# Patient Record
Sex: MALE | Race: WHITE
[De-identification: names, ages, dates, MRNs, and addresses within clinical notes are randomized per-mention and may not be internally consistent; named-entity substitution may affect disease eponyms.]

---

## 2021-02-07 ENCOUNTER — HOSPITAL ENCOUNTER (EMERGENCY)
Dept: HOSPITAL 33 - ED | Age: 26
Discharge: HOME | End: 2021-02-07
Payer: MEDICAID

## 2021-02-07 VITALS — HEART RATE: 54 BPM | OXYGEN SATURATION: 98 % | DIASTOLIC BLOOD PRESSURE: 75 MMHG | SYSTOLIC BLOOD PRESSURE: 117 MMHG

## 2021-02-07 DIAGNOSIS — R07.89: Primary | ICD-10-CM

## 2021-02-07 LAB
ALBUMIN SERPL-MCNC: 4.9 G/DL (ref 3.5–5)
ALP SERPL-CCNC: 68 U/L (ref 38–126)
ALT SERPL-CCNC: 41 U/L (ref 0–50)
ANION GAP SERPL CALC-SCNC: 13.6 MEQ/L (ref 5–15)
AST SERPL QL: 34 U/L (ref 17–59)
BASOPHILS # BLD AUTO: 0.03 10*3/UL (ref 0–0.4)
BASOPHILS NFR BLD AUTO: 0.3 % (ref 0–0.4)
BILIRUB BLD-MCNC: 0.6 MG/DL (ref 0.2–1.3)
BNP SERPL-MCNC: 23.6 PG/ML (ref 0–450)
BUN SERPL-MCNC: 15 MG/DL (ref 9–20)
CALCIUM SPEC-MCNC: 10.4 MG/DL (ref 8.4–10.2)
CHLORIDE SERPL-SCNC: 101 MMOL/L (ref 98–107)
CO2 SERPL-SCNC: 28 MMOL/L (ref 22–30)
CREAT SERPL-MCNC: 0.97 MG/DL (ref 0.66–1.25)
EOSINOPHIL # BLD AUTO: 0.26 10*3/UL (ref 0–0.5)
GFR SERPLBLD BASED ON 1.73 SQ M-ARVRAT: > 60 ML/MIN
GLUCOSE SERPL-MCNC: 97 MG/DL (ref 74–106)
HCT VFR BLD AUTO: 49.1 % (ref 42–50)
HGB BLD-MCNC: 15.9 GM/DL (ref 12.5–18)
LYMPHOCYTES # SPEC AUTO: 3.88 10*3/UL (ref 1–4.6)
MAGNESIUM SERPL-MCNC: 2.1 MG/DL (ref 1.6–2.3)
MCH RBC QN AUTO: 30.6 PG (ref 26–32)
MCHC RBC AUTO-ENTMCNC: 32.4 G/DL (ref 32–36)
MONOCYTES # BLD AUTO: 0.96 10*3/UL (ref 0–1.3)
PLATELET # BLD AUTO: 224 K/MM3 (ref 150–450)
POTASSIUM SERPLBLD-SCNC: 3.8 MMOL/L (ref 3.5–5.1)
PROT SERPL-MCNC: 7.8 G/DL (ref 6.3–8.2)
RBC # BLD AUTO: 5.19 M/MM3 (ref 4.1–5.6)
SODIUM SERPL-SCNC: 139 MMOL/L (ref 137–145)
WBC # BLD AUTO: 10.2 K/MM3 (ref 4–10.5)

## 2021-02-07 PROCEDURE — 83735 ASSAY OF MAGNESIUM: CPT

## 2021-02-07 PROCEDURE — 71046 X-RAY EXAM CHEST 2 VIEWS: CPT

## 2021-02-07 PROCEDURE — 80053 COMPREHEN METABOLIC PANEL: CPT

## 2021-02-07 PROCEDURE — 85025 COMPLETE CBC W/AUTO DIFF WBC: CPT

## 2021-02-07 PROCEDURE — 36415 COLL VENOUS BLD VENIPUNCTURE: CPT

## 2021-02-07 PROCEDURE — 99284 EMERGENCY DEPT VISIT MOD MDM: CPT

## 2021-02-07 PROCEDURE — 93041 RHYTHM ECG TRACING: CPT

## 2021-02-07 PROCEDURE — 83880 ASSAY OF NATRIURETIC PEPTIDE: CPT

## 2021-02-07 PROCEDURE — 93005 ELECTROCARDIOGRAM TRACING: CPT

## 2021-02-07 PROCEDURE — 84484 ASSAY OF TROPONIN QUANT: CPT

## 2021-02-07 PROCEDURE — 94760 N-INVAS EAR/PLS OXIMETRY 1: CPT

## 2021-02-07 PROCEDURE — 85379 FIBRIN DEGRADATION QUANT: CPT

## 2021-02-07 NOTE — ERPHSYRPT
- History of Present Illness


Time Seen by Provider: 02/07/21 19:10


Historian: patient


Exam Limitations: no limitations


Patient Subjective Stated Complaint: pt here for chest pain to left side of 

chest. pain started at 1500 today while smoking, states hurts to lift arm


Triage Nursing Assessment: pt alert,walked in,able to undress self, resp easy, 

skin w/d/p. face mask in place,


Physician History: 





For the past 4 hours pt has had constant left anterior chest pressure 2/10 in 

severity; denies shortness of air, fever, diaphoresis, nausea, vomiting.


Aspirin Treatment Today: 81 mg x 4, provided by ED


Allergies/Adverse Reactions: 








No Known Drug Allergies Allergy (Verified 02/07/21 18:51)


   





Home Medications: 








No Reportable Medications [No Reported Medications]  10/06/15 [History]





Hx Tetanus, Diphtheria Vaccination/Date Given: No


Hx Influenza Vaccination/Date Given: No


Hx Pneumococcal Vaccination/Date Given: No


Immunizations Up to Date: Yes





Travel Risk





- International Travel


Have you traveled outside of the country in past 3 weeks: No





- Coronavirus Screening


Are you exhibiting any of the following symptoms?: No


Close contact with a COVID-19 positive Pt in past 14-21 Days: No





- Review of Systems


Constitutional: No Fever, No Chills


Respiratory: No Dyspnea


Cardiac: Other (chest pressure)


Abdominal/Gastrointestinal: No Abdominal Pain, No Nausea, No Vomiting


Skin: No Rash


Neurological: No Headache


All Other Systems: Reviewed and Negative





- Past Medical History


Pertinent Past Medical History: No


Neurological History: No Pertinent History


ENT History: No Pertinent History


Cardiac History: No Pertinent History


Respiratory History: No Pertinent History


Endocrine Medical History: No Pertinent History


Musculoskeletal History: No Pertinent History


GI Medical History: No Pertinent History


 History: No Pertinent History


Psycho-Social History: No Pertinent History


Male Reproductive Disorders: No Pertinent History





- Past Surgical History


Past Surgical History: No


Neuro Surgical History: No Pertinent History


Cardiac: No Pertinent History


Respiratory: No Pertinent History


Gastrointestinal: No Pertinent History


Genitourinary: No Pertinent History


Musculoskeletal: No Pertinent History


Male Surgical History: No Pertinent History





- Social History


Smoking Status: Current every day smoker


Exposure to second hand smoke: Yes


Drug Use: marijuana


Patient Lives Alone: No


Significant Family History: no pertinent family hx





- Nursing Vital Signs


Nursing Vital Signs: 


                               Initial Vital Signs











Temperature  98.4 F   02/07/21 18:49


 


Pulse Rate  78   02/07/21 18:49


 


Respiratory Rate  20   02/07/21 18:49


 


Blood Pressure  147/98   02/07/21 18:49


 


O2 Sat by Pulse Oximetry  99   02/07/21 18:49








                                   Pain Scale











Pain Intensity                 4

















- Physical Exam


General Appearance: alert


Eye Exam: PERRL/EOMI


Ears, Nose, Throat Exam: TMs normal, pharynx normal, moist mucous membranes


Neck Exam: normal inspection


Respiratory Exam: normal breath sounds


Cardiovascular Exam: normal heart sounds


Gastrointestinal/Abdomen Exam: normal bowel sounds


Back Exam: normal range of motion


Extremity Exam: No pedal edema


Neurologic Exam: alert, cooperative


Skin Exam: No rash


**SpO2 Interpretation**: normal


SpO2: 99


O2 Delivery: Room Air





- Course


Nursing assessment & vital signs reviewed: Yes


EKG Interpreted by Me: RATE ([id number]), Sinus Bill, NORMAL AXIS, NORMAL INTERVALS





- Radiology Exams


  ** Chest


X-ray Interpretation: Interpreted by me, No Pneumonia


Ordered Tests: 


                               Active Orders 24 hr











 Category Date Time Status


 


 Cardiac Monitor STAT Care  02/07/21 19:19 Active


 


 EKG-ER Only STAT Care  02/07/21 19:18 Active


 


 Pulse Oximetry (ED) STAT Care  02/07/21 19:18 Active


 


 CHEST 2 VIEWS (PA AND LAT) Stat Exams  02/07/21 19:18 Taken


 


 CBC W DIFF Stat Lab  02/07/21 19:45 Completed


 


 CMP Stat Lab  02/07/21 19:45 Completed


 


 D-DIMER QUANTITATIVE Stat Lab  02/07/21 19:45 Completed


 


 MAGNESIUM Stat Lab  02/07/21 19:45 Completed


 


 NT PRO BNP Stat Lab  02/07/21 19:45 Completed


 


 TROPONIN Q3H Lab  02/07/21 19:45 Completed


 


 TROPONIN Q3H Lab  02/07/21 22:30 Ordered


 


 TROPONIN Q3H Lab  02/08/21 01:30 Ordered


 


 TROPONIN Q3H Lab  02/08/21 04:30 Ordered


 


 TROPONIN Q3H Lab  02/08/21 07:30 Ordered








Medication Summary














Discontinued Medications














Generic Name Dose Route Start Last Admin





  Trade Name Freq  PRN Reason Stop Dose Admin


 


Aspirin  324 mg  02/07/21 19:18  02/07/21 19:36





  Baby Aspirin 81 Mg Chew***  PO  02/07/21 19:19  324 mg





  STAT ONE   Administration


 


Nitroglycerin  0.4 mg  02/07/21 19:18  02/07/21 19:36





  Nitrostat 0.4 Mg (Ed)***  SL  02/07/21 19:19  0.4 mg





  STAT ONE   Administration











Lab/Rad Data: 


                           Laboratory Result Diagrams





                                 02/07/21 19:45 





                                 02/07/21 19:45 





                               Laboratory Results











  02/07/21 02/07/21 02/07/21 Range/Units





  19:45 19:45 19:45 


 


WBC     (4.0-10.5)  K/mm3


 


RBC     (4.1-5.6)  M/mm3


 


Hgb     (12.5-18.0)  gm/dl


 


Hct     (42-50)  %


 


MCV     ()  fl


 


MCH     (26-32)  pg


 


MCHC     (32-36)  g/dl


 


RDW     (11.5-14.0)  %


 


Plt Count     (150-450)  K/mm3


 


MPV     (7.5-11.0)  fl


 


Gran %     (36.0-66.0)  %


 


Eos # (Auto)     (0-0.5)  


 


Absolute Lymphs (auto)     (1.0-4.6)  


 


Absolute Monos (auto)     (0.0-1.3)  


 


Lymphocytes %     (24.0-44.0)  %


 


Monocytes %     (0.0-12.0)  %


 


Eosinophils %     (0.00-5.0)  %


 


Basophils %     (0.0-0.4)  %


 


Absolute Granulocytes     (1.4-6.9)  


 


Basophils #     (0-0.4)  


 


D-Dimer   < 215 L   (215-500)  ng/mL


 


Sodium    139  (137-145)  mmol/L


 


Potassium    3.8  (3.5-5.1)  mmol/L


 


Chloride    101  ()  mmol/L


 


Carbon Dioxide    28  (22-30)  mmol/L


 


Anion Gap    13.6  (5-15)  MEQ/L


 


BUN    15  (9-20)  mg/dL


 


Creatinine    0.97  (0.66-1.25)  mg/dL


 


Estimated GFR    > 60.0  ML/MIN


 


Glucose    97  ()  mg/dL


 


Calcium    10.4 H  (8.4-10.2)  mg/dL


 


Magnesium    2.1  (1.6-2.3)  mg/dL


 


Total Bilirubin    0.60  (0.2-1.3)  mg/dL


 


AST    34  (17-59)  U/L


 


ALT    41  (0-50)  U/L


 


Alkaline Phosphatase    68  ()  U/L


 


Troponin I  < 0.012    (0.000-0.034)  ng/mL


 


NT-Pro-B Natriuret Pep    23.6  (0-450)  pg/mL


 


Serum Total Protein    7.8  (6.3-8.2)  g/dL


 


Albumin    4.9  (3.5-5.0)  g/dL














  02/07/21 Range/Units





  19:45 


 


WBC  10.2  (4.0-10.5)  K/mm3


 


RBC  5.19  (4.1-5.6)  M/mm3


 


Hgb  15.9  (12.5-18.0)  gm/dl


 


Hct  49.1  (42-50)  %


 


MCV  94.6  ()  fl


 


MCH  30.6  (26-32)  pg


 


MCHC  32.4  (32-36)  g/dl


 


RDW  13.2  (11.5-14.0)  %


 


Plt Count  224  (150-450)  K/mm3


 


MPV  11.0  (7.5-11.0)  fl


 


Gran %  49.8  (36.0-66.0)  %


 


Eos # (Auto)  0.26  (0-0.5)  


 


Absolute Lymphs (auto)  3.88  (1.0-4.6)  


 


Absolute Monos (auto)  0.96  (0.0-1.3)  


 


Lymphocytes %  38.0  (24.0-44.0)  %


 


Monocytes %  9.4  (0.0-12.0)  %


 


Eosinophils %  2.5  (0.00-5.0)  %


 


Basophils %  0.3  (0.0-0.4)  %


 


Absolute Granulocytes  5.07  (1.4-6.9)  


 


Basophils #  0.03  (0-0.4)  


 


D-Dimer   (215-500)  ng/mL


 


Sodium   (137-145)  mmol/L


 


Potassium   (3.5-5.1)  mmol/L


 


Chloride   ()  mmol/L


 


Carbon Dioxide   (22-30)  mmol/L


 


Anion Gap   (5-15)  MEQ/L


 


BUN   (9-20)  mg/dL


 


Creatinine   (0.66-1.25)  mg/dL


 


Estimated GFR   ML/MIN


 


Glucose   ()  mg/dL


 


Calcium   (8.4-10.2)  mg/dL


 


Magnesium   (1.6-2.3)  mg/dL


 


Total Bilirubin   (0.2-1.3)  mg/dL


 


AST   (17-59)  U/L


 


ALT   (0-50)  U/L


 


Alkaline Phosphatase   ()  U/L


 


Troponin I   (0.000-0.034)  ng/mL


 


NT-Pro-B Natriuret Pep   (0-450)  pg/mL


 


Serum Total Protein   (6.3-8.2)  g/dL


 


Albumin   (3.5-5.0)  g/dL














- Progress


Progress: unchanged


Counseled pt/family regarding: lab results, diagnosis, rad results





- Departure


Departure Disposition: Home


Clinical Impression: 


 Chest pressure





Condition: Stable


Critical Care Time: No


Referrals: 


AYSHA JONES [Primary Care Provider] - 


Instructions:  Chest Pain (DC)


Additional Instructions: 


Follow up with Dr. Cruz tomorrow.


Forms:  Work/School Release Form

## 2021-02-08 NOTE — XRAY
Indication: Chest pressure.



Comparison: None



PA/lateral chest hyperinflated and clear.  Heart is not enlarged.  Bony thorax

intact.



Impression: Nonacute hyperinflated chest.